# Patient Record
Sex: MALE | Race: OTHER | Employment: STUDENT | URBAN - METROPOLITAN AREA
[De-identification: names, ages, dates, MRNs, and addresses within clinical notes are randomized per-mention and may not be internally consistent; named-entity substitution may affect disease eponyms.]

---
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
## 2022-09-27 ENCOUNTER — OFFICE VISIT (OUTPATIENT)
Dept: URGENT CARE | Facility: CLINIC | Age: 22
End: 2022-09-27
Payer: COMMERCIAL

## 2022-09-27 DIAGNOSIS — S61.432A PUNCTURE WOUND OF LEFT HAND, FOREIGN BODY PRESENCE UNSPECIFIED, INITIAL ENCOUNTER: Primary | ICD-10-CM

## 2022-09-27 PROBLEM — S61.439A PUNCTURE WOUND OF HAND: Status: ACTIVE | Noted: 2022-09-27

## 2022-09-27 PROCEDURE — G0382 LEV 3 HOSP TYPE B ED VISIT: HCPCS | Performed by: FAMILY MEDICINE

## 2022-09-27 RX ORDER — AMOXICILLIN AND CLAVULANATE POTASSIUM 875; 125 MG/1; MG/1
1 TABLET, FILM COATED ORAL EVERY 12 HOURS SCHEDULED
Qty: 10 TABLET | Refills: 0 | Status: SHIPPED | OUTPATIENT
Start: 2022-09-27 | End: 2022-10-02

## 2022-09-27 NOTE — PROGRESS NOTES
[id number]        NAME: Osiel Sarmiento is a 23 y o  male  : 2000    MRN: [id number]  DATE: 2022  TIME: 7:54 PM    Assessment and Plan   Puncture wound of left hand, foreign body presence unspecified, initial encounter [I36 432A]  1  Puncture wound of left hand, foreign body presence unspecified, initial encounter           Patient Instructions       Follow up with PCP in 3-5 days  Proceed to  ER if symptoms worsen  Chief Complaint   No chief complaint on file  History of Present Illness       47-year-old male presenting with puncture wound to his left index finger  He works as a  and says he poked himself with a metal tool yesterday  Tetanus up-to-date  Review of Systems   Review of Systems   Constitutional: Negative  HENT: Negative  Eyes: Negative  Respiratory: Negative  Cardiovascular: Negative  Gastrointestinal: Negative  Genitourinary: Negative  Skin: Positive for wound  Allergic/Immunologic: Negative  Neurological: Negative  Hematological: Negative  Psychiatric/Behavioral: Negative  Current Medications     No current outpatient medications on file  Current Allergies     Allergies as of 2022    (No Known Allergies)            The following portions of the patient's history were reviewed and updated as appropriate: allergies, current medications, past family history, past medical history, past social history, past surgical history and problem list      No past medical history on file  No past surgical history on file  No family history on file  Medications have been verified  Objective   There were no vitals taken for this visit  No LMP for male patient  Physical Exam     Physical Exam  Constitutional:       Appearance: He is well-developed  HENT:      Head: Normocephalic  Eyes:      Pupils: Pupils are equal, round, and reactive to light     Pulmonary:      Effort: Pulmonary effort is normal    Musculoskeletal:         General: Normal range of motion  Cervical back: Normal range of motion  Skin:     General: Skin is warm  Findings: Wound present  Neurological:      Mental Status: He is alert and oriented to person, place, and time

## 2024-07-08 LAB
ALBUMIN SERPL BCG-MCNC: 4.9 G/DL (ref 3.5–5)
ALP SERPL-CCNC: 65 U/L (ref 34–104)
ALT SERPL W P-5'-P-CCNC: 38 U/L (ref 7–52)
ANION GAP SERPL CALCULATED.3IONS-SCNC: 8 MMOL/L (ref 4–13)
AST SERPL W P-5'-P-CCNC: 22 U/L (ref 13–39)
BACTERIA UR QL AUTO: ABNORMAL /HPF
BASOPHILS # BLD AUTO: 0.03 THOUSANDS/ÂΜL (ref 0–0.1)
BASOPHILS NFR BLD AUTO: 0 % (ref 0–1)
BILIRUB SERPL-MCNC: 1.84 MG/DL (ref 0.2–1)
BILIRUB UR QL STRIP: NEGATIVE
BUN SERPL-MCNC: 11 MG/DL (ref 5–25)
CALCIUM SERPL-MCNC: 9.5 MG/DL (ref 8.4–10.2)
CHLORIDE SERPL-SCNC: 100 MMOL/L (ref 96–108)
CLARITY UR: CLEAR
CO2 SERPL-SCNC: 25 MMOL/L (ref 21–32)
COLOR UR: ABNORMAL
CREAT SERPL-MCNC: 0.69 MG/DL (ref 0.6–1.3)
EOSINOPHIL # BLD AUTO: 0.19 THOUSAND/ÂΜL (ref 0–0.61)
EOSINOPHIL NFR BLD AUTO: 2 % (ref 0–6)
ERYTHROCYTE [DISTWIDTH] IN BLOOD BY AUTOMATED COUNT: 12.5 % (ref 11.6–15.1)
GFR SERPL CREATININE-BSD FRML MDRD: 134 ML/MIN/1.73SQ M
GLUCOSE SERPL-MCNC: 102 MG/DL (ref 65–140)
GLUCOSE UR STRIP-MCNC: NEGATIVE MG/DL
HCT VFR BLD AUTO: 44.8 % (ref 36.5–49.3)
HGB BLD-MCNC: 15.1 G/DL (ref 12–17)
HGB UR QL STRIP.AUTO: NEGATIVE
IMM GRANULOCYTES # BLD AUTO: 0.05 THOUSAND/UL (ref 0–0.2)
IMM GRANULOCYTES NFR BLD AUTO: 1 % (ref 0–2)
KETONES UR STRIP-MCNC: NEGATIVE MG/DL
LEUKOCYTE ESTERASE UR QL STRIP: NEGATIVE
LIPASE SERPL-CCNC: 23 U/L (ref 11–82)
LYMPHOCYTES # BLD AUTO: 1.61 THOUSANDS/ÂΜL (ref 0.6–4.47)
LYMPHOCYTES NFR BLD AUTO: 15 % (ref 14–44)
MCH RBC QN AUTO: 29 PG (ref 26.8–34.3)
MCHC RBC AUTO-ENTMCNC: 33.7 G/DL (ref 31.4–37.4)
MCV RBC AUTO: 86 FL (ref 82–98)
MONOCYTES # BLD AUTO: 1.35 THOUSAND/ÂΜL (ref 0.17–1.22)
MONOCYTES NFR BLD AUTO: 13 % (ref 4–12)
MUCOUS THREADS UR QL AUTO: ABNORMAL
NEUTROPHILS # BLD AUTO: 7.28 THOUSANDS/ÂΜL (ref 1.85–7.62)
NEUTS SEG NFR BLD AUTO: 69 % (ref 43–75)
NITRITE UR QL STRIP: NEGATIVE
NON-SQ EPI CELLS URNS QL MICRO: ABNORMAL /HPF
NRBC BLD AUTO-RTO: 0 /100 WBCS
PH UR STRIP.AUTO: 6 [PH]
PLATELET # BLD AUTO: 210 THOUSANDS/UL (ref 149–390)
PMV BLD AUTO: 9.2 FL (ref 8.9–12.7)
POTASSIUM SERPL-SCNC: 3.4 MMOL/L (ref 3.5–5.3)
PROT SERPL-MCNC: 7.7 G/DL (ref 6.4–8.4)
PROT UR STRIP-MCNC: ABNORMAL MG/DL
RBC # BLD AUTO: 5.2 MILLION/UL (ref 3.88–5.62)
RBC #/AREA URNS AUTO: ABNORMAL /HPF
SODIUM SERPL-SCNC: 133 MMOL/L (ref 135–147)
SP GR UR STRIP.AUTO: >=1.03 (ref 1–1.03)
UROBILINOGEN UR STRIP-ACNC: 2 MG/DL
WBC # BLD AUTO: 10.51 THOUSAND/UL (ref 4.31–10.16)
WBC #/AREA URNS AUTO: ABNORMAL /HPF

## 2024-07-08 PROCEDURE — 99284 EMERGENCY DEPT VISIT MOD MDM: CPT | Performed by: EMERGENCY MEDICINE

## 2024-07-08 PROCEDURE — 36415 COLL VENOUS BLD VENIPUNCTURE: CPT

## 2024-07-08 PROCEDURE — 99284 EMERGENCY DEPT VISIT MOD MDM: CPT

## 2024-07-08 PROCEDURE — 85025 COMPLETE CBC W/AUTO DIFF WBC: CPT | Performed by: EMERGENCY MEDICINE

## 2024-07-08 PROCEDURE — 83690 ASSAY OF LIPASE: CPT | Performed by: EMERGENCY MEDICINE

## 2024-07-08 PROCEDURE — 81001 URINALYSIS AUTO W/SCOPE: CPT | Performed by: EMERGENCY MEDICINE

## 2024-07-08 PROCEDURE — 80053 COMPREHEN METABOLIC PANEL: CPT | Performed by: EMERGENCY MEDICINE

## 2024-07-09 ENCOUNTER — HOSPITAL ENCOUNTER (EMERGENCY)
Facility: HOSPITAL | Age: 24
Discharge: HOME | End: 2024-07-09
Attending: EMERGENCY MEDICINE | Admitting: EMERGENCY MEDICINE

## 2024-07-09 VITALS
RESPIRATION RATE: 18 BRPM | DIASTOLIC BLOOD PRESSURE: 76 MMHG | OXYGEN SATURATION: 98 % | TEMPERATURE: 98.3 F | WEIGHT: 165 LBS | HEIGHT: 68 IN | BODY MASS INDEX: 25.01 KG/M2 | SYSTOLIC BLOOD PRESSURE: 137 MMHG | HEART RATE: 82 BPM

## 2024-07-09 DIAGNOSIS — R11.2 NAUSEA VOMITING AND DIARRHEA: Primary | ICD-10-CM

## 2024-07-09 DIAGNOSIS — R19.7 NAUSEA VOMITING AND DIARRHEA: Primary | ICD-10-CM

## 2024-07-09 PROCEDURE — 96361 HYDRATE IV INFUSION ADD-ON: CPT

## 2024-07-09 PROCEDURE — 96375 TX/PRO/DX INJ NEW DRUG ADDON: CPT

## 2024-07-09 PROCEDURE — 96374 THER/PROPH/DIAG INJ IV PUSH: CPT

## 2024-07-09 RX ORDER — KETOROLAC TROMETHAMINE 30 MG/ML
15 INJECTION, SOLUTION INTRAMUSCULAR; INTRAVENOUS ONCE
Status: COMPLETED | OUTPATIENT
Start: 2024-07-09 | End: 2024-07-09

## 2024-07-09 RX ORDER — ONDANSETRON 2 MG/ML
4 INJECTION INTRAMUSCULAR; INTRAVENOUS ONCE
Status: COMPLETED | OUTPATIENT
Start: 2024-07-09 | End: 2024-07-09

## 2024-07-09 RX ORDER — ONDANSETRON 4 MG/1
4 TABLET, FILM COATED ORAL EVERY 6 HOURS
Qty: 12 TABLET | Refills: 0 | Status: SHIPPED | OUTPATIENT
Start: 2024-07-09

## 2024-07-09 RX ORDER — PANTOPRAZOLE SODIUM 40 MG/10ML
40 INJECTION, POWDER, LYOPHILIZED, FOR SOLUTION INTRAVENOUS ONCE
Status: COMPLETED | OUTPATIENT
Start: 2024-07-09 | End: 2024-07-09

## 2024-07-09 RX ADMIN — SODIUM CHLORIDE 1000 ML: 0.9 INJECTION, SOLUTION INTRAVENOUS at 00:39

## 2024-07-09 RX ADMIN — KETOROLAC TROMETHAMINE 15 MG: 30 INJECTION, SOLUTION INTRAMUSCULAR; INTRAVENOUS at 00:38

## 2024-07-09 RX ADMIN — ONDANSETRON 4 MG: 2 INJECTION INTRAMUSCULAR; INTRAVENOUS at 00:38

## 2024-07-09 RX ADMIN — PANTOPRAZOLE SODIUM 40 MG: 40 INJECTION, POWDER, FOR SOLUTION INTRAVENOUS at 01:25

## 2024-07-09 NOTE — ED PROVIDER NOTES
History  Chief Complaint   Patient presents with    Abdominal Pain     Started last night at 0300, states he vomited x6 today and has had diarrhea all day.  Denies blood/black in vomit/stools.  Denies fevers.  Abd pain started in LLQ, migrated to epigastric.      Claude Elliott is a 24 y.o. year old male presenting to the [facility] ED for vomiting, abdominal pain and diarrhea. Patient reported to have onset of nausea early this morning and throughout day noted to have several episodes of nonbloody vomiting and watery diarrhea. Patient reporting generalized abdominal pain mostly in the upper abdominal region. Patient notes niece recently sick with similar symptoms. No associated fevers/chills. Patient denies back/flank pain or dysuria/hematuria. Patient has taken tylenol and peptobismol at home for symptomatic treatment.      History provided by:  Medical records and patient   used: No    Abdominal Pain  Associated symptoms: diarrhea, fatigue, nausea and vomiting    Associated symptoms: no chest pain, no chills, no cough, no fever and no shortness of breath        None       History reviewed. No pertinent past medical history.    History reviewed. No pertinent surgical history.    History reviewed. No pertinent family history.  I have reviewed and agree with the history as documented.    E-Cigarette/Vaping    E-Cigarette Use Never User      E-Cigarette/Vaping Substances     Social History     Tobacco Use    Smoking status: Passive Smoke Exposure - Never Smoker   Vaping Use    Vaping status: Never Used   Substance Use Topics    Alcohol use: Never    Drug use: Never       Review of Systems   Constitutional:  Positive for fatigue. Negative for chills and fever.   HENT:  Negative for congestion.    Respiratory:  Negative for cough and shortness of breath.    Cardiovascular:  Negative for chest pain.   Gastrointestinal:  Positive for abdominal pain, diarrhea, nausea and vomiting.   Genitourinary:   Negative for flank pain.   Musculoskeletal:  Negative for back pain.   All other systems reviewed and are negative.      Physical Exam  Physical Exam  Vitals and nursing note reviewed.   Constitutional:       General: He is not in acute distress.     Appearance: He is well-developed. He is not ill-appearing, toxic-appearing or diaphoretic.   HENT:      Head: Normocephalic and atraumatic.      Nose: No congestion or rhinorrhea.      Mouth/Throat:      Mouth: Mucous membranes are dry.   Eyes:      General:         Right eye: No discharge.         Left eye: No discharge.   Cardiovascular:      Rate and Rhythm: Normal rate and regular rhythm.   Pulmonary:      Effort: Pulmonary effort is normal. No respiratory distress.      Breath sounds: Normal breath sounds. No wheezing or rales.   Abdominal:      General: There is no distension.      Palpations: Abdomen is soft.      Tenderness: There is abdominal tenderness in the right upper quadrant and epigastric area. There is no right CVA tenderness, left CVA tenderness, guarding or rebound.   Musculoskeletal:      Cervical back: Normal range of motion. No rigidity.   Skin:     General: Skin is warm.      Capillary Refill: Capillary refill takes less than 2 seconds.   Neurological:      Mental Status: He is alert and oriented to person, place, and time.   Psychiatric:         Mood and Affect: Mood and affect and mood normal.         Behavior: Behavior normal.         Vital Signs  ED Triage Vitals [07/08/24 2238]   Temperature Pulse Respirations Blood Pressure SpO2   98.3 °F (36.8 °C) 83 16 151/84 97 %      Temp Source Heart Rate Source Patient Position - Orthostatic VS BP Location FiO2 (%)   Oral Monitor -- Right arm --      Pain Score       6           Vitals:    07/08/24 2238 07/09/24 0030   BP: 151/84 137/76   Pulse: 83 82         Visual Acuity      ED Medications  Medications   ketorolac (TORADOL) injection 15 mg (15 mg Intravenous Given 7/9/24 0038)   sodium chloride 0.9  % bolus 1,000 mL (0 mL Intravenous Stopped 7/9/24 0139)   ondansetron (ZOFRAN) injection 4 mg (4 mg Intravenous Given 7/9/24 0038)   pantoprazole (PROTONIX) injection 40 mg (40 mg Intravenous Given 7/9/24 0125)       Diagnostic Studies  Results Reviewed       Procedure Component Value Units Date/Time    Urine Microscopic [[id number]]  (Abnormal) Collected: 07/08/24 2251    Lab Status: Final result Specimen: Urine, Clean Catch Updated: 07/08/24 2351     RBC, UA None Seen /hpf      WBC, UA 2-4 /hpf      Epithelial Cells Occasional /hpf      Bacteria, UA Occasional /hpf      MUCUS THREADS Innumerable    Lipase [[id number]]  (Normal) Collected: 07/08/24 2244    Lab Status: Final result Specimen: Blood from Arm, Right Updated: 07/08/24 2314     Lipase 23 u/L     Comprehensive metabolic panel [[id number]]  (Abnormal) Collected: 07/08/24 2244    Lab Status: Final result Specimen: Blood from Arm, Right Updated: 07/08/24 2314     Sodium 133 mmol/L      Potassium 3.4 mmol/L      Chloride 100 mmol/L      CO2 25 mmol/L      ANION GAP 8 mmol/L      BUN 11 mg/dL      Creatinine 0.69 mg/dL      Glucose 102 mg/dL      Calcium 9.5 mg/dL      AST 22 U/L      ALT 38 U/L      Alkaline Phosphatase 65 U/L      Total Protein 7.7 g/dL      Albumin 4.9 g/dL      Total Bilirubin 1.84 mg/dL      eGFR 134 ml/min/1.73sq m     Narrative:      [employer] guidelines for Chronic Kidney Disease (CKD):     Stage 1 with normal or high GFR (GFR > 90 mL/min/1.73 square meters)    Stage 2 Mild CKD (GFR = 60-89 mL/min/1.73 square meters)    Stage 3A Moderate CKD (GFR = 45-59 mL/min/1.73 square meters)    Stage 3B Moderate CKD (GFR = 30-44 mL/min/1.73 square meters)    Stage 4 Severe CKD (GFR = 15-29 mL/min/1.73 square meters)    Stage 5 End Stage CKD (GFR <15 mL/min/1.73 square meters)  Note: GFR calculation is accurate only with a steady state creatinine    UA w Reflex to Microscopic w Reflex to Culture [[id number]]  (Abnormal) Collected:  07/08/24 2251    Lab Status: Final result Specimen: Urine, Clean Catch Updated: 07/08/24 2305     Color, UA Dark Green     Clarity, UA Clear     Specific Gravity, UA >=1.030     pH, UA 6.0     Leukocytes, UA Negative     Nitrite, UA Negative     Protein, UA 30 (1+) mg/dl      Glucose, UA Negative mg/dl      Ketones, UA Negative mg/dl      Urobilinogen, UA 2.0 mg/dl      Bilirubin, UA Negative     Occult Blood, UA Negative    CBC and differential [[id number]]  (Abnormal) Collected: 07/08/24 2244    Lab Status: Final result Specimen: Blood from Arm, Right Updated: 07/08/24 2256     WBC 10.51 Thousand/uL      RBC 5.20 Million/uL      Hemoglobin 15.1 g/dL      Hematocrit 44.8 %      MCV 86 fL      MCH 29.0 pg      MCHC 33.7 g/dL      RDW 12.5 %      MPV 9.2 fL      Platelets 210 Thousands/uL      nRBC 0 /100 WBCs      Segmented % 69 %      Immature Grans % 1 %      Lymphocytes % 15 %      Monocytes % 13 %      Eosinophils Relative 2 %      Basophils Relative 0 %      Absolute Neutrophils 7.28 Thousands/µL      Absolute Immature Grans 0.05 Thousand/uL      Absolute Lymphocytes 1.61 Thousands/µL      Absolute Monocytes 1.35 Thousand/µL      Eosinophils Absolute 0.19 Thousand/µL      Basophils Absolute 0.03 Thousands/µL                    No orders to display              Procedures  Procedures         ED Course                               SBIRT 20yo+      Flowsheet Row Most Recent Value   Initial Alcohol Screen: US AUDIT-C     1. How often do you have a drink containing alcohol? 0 Filed at: 07/09/2024 0123   2. How many drinks containing alcohol do you have on a typical day you are drinking?  0 Filed at: 07/09/2024 0123   3a. Male UNDER 65: How often do you have five or more drinks on one occasion? 0 Filed at: 07/09/2024 0123   Audit-C Score 0 Filed at: 07/09/2024 0123   JANIYA: How many times in the past year have you...    Used an illegal drug or used a prescription medication for non-medical reasons? Never Filed at:  07/09/2024 0123                      Medical Decision Making    24 y.o. male presenting for vomiting and diarrhea.  VSS, nontoxic appearing.  Will order labs to evaluate for leukocytosis, anemia, electrolyte abnormality, biliary disease or pancreatitis.  Will obtain UA to evaluate for UTI.  Given associated diarrhea do not suspect cholecystitis or appendicitis.  Symptoms more likely due to viral gastroenteritis.  Will treat supportively.    Reassessment: Vital stable, nontoxic-appearing.  No vomiting during ED course.  Reviewed labs with patient.  Overall suspect symptoms due to gastroenteritis.  Patient agreeable to defer CT imaging at this time.    Disposition: I have discussed with the patient our plan to discharge them from the ED and the patient is in agreement with this plan.     Discharge Plan: Rx for zofran, OTC tylenol/motrin for discomfort. Encouraged bland diet. RTED precautions emphasized. The patient was provided a written after visit summary with strict RTED precautions.     Followup: I have discussed with the patient plan to follow up with their PCP. Contact information provided in AVS.    Amount and/or Complexity of Data Reviewed  Labs: ordered.    Risk  Prescription drug management.             Disposition  Final diagnoses:   Nausea vomiting and diarrhea     Time reflects when diagnosis was documented in both MDM as applicable and the Disposition within this note       Time User Action Codes Description Comment    7/9/2024  1:32 AM Romulo Tate Add [R11.2,  R19.7] Nausea vomiting and diarrhea           ED Disposition       ED Disposition   Discharge    Condition   Stable    Date/Time   Tue Jul 9, 2024 0210    Comment   Claude Elliott discharge to home/self care.                   Follow-up Information       Follow up With Specialties Details Why Contact Info    Janine Ledesma MD Pediatrics Schedule an appointment as soon as possible for a visit  To make appointment for reevaluation in 3-5  days. [address]  [facility] [address]  [phone]              Discharge Medication List as of 7/9/2024  2:10 AM        START taking these medications    Details   ondansetron (ZOFRAN) 4 mg tablet Take 1 tablet (4 mg total) by mouth every 6 (six) hours, Starting Tue 7/9/2024, Normal             No discharge procedures on file.    PDMP Review       None            ED Provider  Electronically Signed by             Romulo Tate DO  07/09/24 [id number]

## 2024-07-09 NOTE — DISCHARGE INSTRUCTIONS
You have been seen for vomiting and diarrhea. Please take zofran as needed for nausea. Return to the emergency department if you develop worsening pain, vomiting, fevers or any other symptoms of concern. Please follow up with your PCP by calling the number provided.